# Patient Record
Sex: FEMALE | Race: WHITE | Employment: OTHER | URBAN - METROPOLITAN AREA
[De-identification: names, ages, dates, MRNs, and addresses within clinical notes are randomized per-mention and may not be internally consistent; named-entity substitution may affect disease eponyms.]

---

## 2022-03-19 PROBLEM — E78.2 MIXED HYPERLIPIDEMIA: Status: ACTIVE | Noted: 2020-05-26

## 2022-03-20 PROBLEM — R73.01 ELEVATED FASTING BLOOD SUGAR: Status: ACTIVE | Noted: 2020-05-26

## 2024-04-04 DIAGNOSIS — J44.9 CHRONIC OBSTRUCTIVE PULMONARY DISEASE, UNSPECIFIED COPD TYPE (HCC): Primary | ICD-10-CM

## 2024-04-09 ENCOUNTER — OFFICE VISIT (OUTPATIENT)
Dept: PULMONOLOGY | Age: 69
End: 2024-04-09
Payer: MEDICARE

## 2024-04-09 ENCOUNTER — HOSPITAL ENCOUNTER (OUTPATIENT)
Dept: GENERAL RADIOLOGY | Age: 69
Discharge: HOME | End: 2024-04-12
Payer: MEDICARE

## 2024-04-09 VITALS
TEMPERATURE: 97 F | HEART RATE: 69 BPM | RESPIRATION RATE: 14 BRPM | HEIGHT: 61 IN | BODY MASS INDEX: 24.55 KG/M2 | OXYGEN SATURATION: 97 % | WEIGHT: 130 LBS | SYSTOLIC BLOOD PRESSURE: 119 MMHG | DIASTOLIC BLOOD PRESSURE: 71 MMHG

## 2024-04-09 DIAGNOSIS — J44.9 CHRONIC OBSTRUCTIVE PULMONARY DISEASE, UNSPECIFIED COPD TYPE (HCC): ICD-10-CM

## 2024-04-09 DIAGNOSIS — Z12.2 SCREENING FOR LUNG CANCER: ICD-10-CM

## 2024-04-09 DIAGNOSIS — F17.210 SMOKING GREATER THAN 40 PACK YEARS: ICD-10-CM

## 2024-04-09 DIAGNOSIS — J44.9 CHRONIC OBSTRUCTIVE PULMONARY DISEASE, UNSPECIFIED COPD TYPE (HCC): Primary | ICD-10-CM

## 2024-04-09 DIAGNOSIS — D80.1 HYPOGAMMAGLOBULINEMIA (HCC): ICD-10-CM

## 2024-04-09 LAB
EXPIRATORY TIME: NORMAL
FEF 25-75% %PRED-PRE: NORMAL
FEF 25-75% PRED: NORMAL
FEF 25-75-PRE: NORMAL
FEV1 %PRED-PRE: 119 %
FEV1 PRED: 1.98 L
FEV1/FVC %PRED-PRE: NORMAL
FEV1/FVC PRED: NORMAL
FEV1/FVC: 83 %
FEV1: 2.36 L
FVC %PRED-PRE: 113 %
FVC PRED: 2.54 L
FVC: 2.86 L
PEF %PRED-PRE: NORMAL
PEF PRED: NORMAL
PEF-PRE: NORMAL

## 2024-04-09 PROCEDURE — G8400 PT W/DXA NO RESULTS DOC: HCPCS | Performed by: INTERNAL MEDICINE

## 2024-04-09 PROCEDURE — G8427 DOCREV CUR MEDS BY ELIG CLIN: HCPCS | Performed by: INTERNAL MEDICINE

## 2024-04-09 PROCEDURE — 1123F ACP DISCUSS/DSCN MKR DOCD: CPT | Performed by: INTERNAL MEDICINE

## 2024-04-09 PROCEDURE — 3017F COLORECTAL CA SCREEN DOC REV: CPT | Performed by: INTERNAL MEDICINE

## 2024-04-09 PROCEDURE — 99204 OFFICE O/P NEW MOD 45 MIN: CPT | Performed by: INTERNAL MEDICINE

## 2024-04-09 PROCEDURE — 71046 X-RAY EXAM CHEST 2 VIEWS: CPT

## 2024-04-09 PROCEDURE — G0296 VISIT TO DETERM LDCT ELIG: HCPCS | Performed by: INTERNAL MEDICINE

## 2024-04-09 PROCEDURE — G8420 CALC BMI NORM PARAMETERS: HCPCS | Performed by: INTERNAL MEDICINE

## 2024-04-09 PROCEDURE — 3023F SPIROM DOC REV: CPT | Performed by: INTERNAL MEDICINE

## 2024-04-09 PROCEDURE — 1090F PRES/ABSN URINE INCON ASSESS: CPT | Performed by: INTERNAL MEDICINE

## 2024-04-09 PROCEDURE — 94010 BREATHING CAPACITY TEST: CPT | Performed by: INTERNAL MEDICINE

## 2024-04-09 PROCEDURE — 4004F PT TOBACCO SCREEN RCVD TLK: CPT | Performed by: INTERNAL MEDICINE

## 2024-04-09 RX ORDER — MECLIZINE HYDROCHLORIDE 25 MG/1
25 TABLET ORAL EVERY 12 HOURS PRN
COMMUNITY

## 2024-04-09 RX ORDER — HYDROXYZINE HYDROCHLORIDE 25 MG/1
25 TABLET, FILM COATED ORAL
COMMUNITY

## 2024-04-09 RX ORDER — CELECOXIB 200 MG/1
CAPSULE ORAL
COMMUNITY
Start: 2024-01-25

## 2024-04-09 RX ORDER — MONTELUKAST SODIUM 10 MG/1
10 TABLET ORAL DAILY
COMMUNITY
Start: 2023-12-22

## 2024-04-09 ASSESSMENT — ENCOUNTER SYMPTOMS
EYE PAIN: 1
CONSTIPATION: 1
EYE ITCHING: 1
WHEEZING: 1
COUGH: 1
DIARRHEA: 1

## 2024-04-09 ASSESSMENT — PULMONARY FUNCTION TESTS
FEV1/FVC: 83
FVC_PREDICTED: 2.54
FEV1_PERCENT_PREDICTED_PRE: 119
FEV1: 2.36
FEV1_PREDICTED: 1.98
FVC_PERCENT_PREDICTED_PRE: 113
FVC: 2.86

## 2024-04-09 NOTE — PROGRESS NOTES
[address]  [place] [address]  [phone]    Patient Name:  Farideh Wall  YOB: 1955      Office Visit 4/9/2024    CHIEF COMPLAINT:    Chief Complaint   Patient presents with    New Patient    COPD       HISTORY OF PRESENT ILLNESS:      Patient is 68 years old white female who is here today in referral of Dr. Copeland for evaluation of shortness of breath and COPD.  Patient reports was told she has COPD maybe 2 to 3 years ago.  She does have episodes of repeated bronchitis and pneumonias maybe 2-3 times a year for the last 15 to 20 years.  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  She was told she has COPD maybe 3 years ago.  She was given multiple inhalers however she is refusing to use any.  She really denies shortness of breath.  She mostly complains about her bronchitis and cough she does cough all the time.  She says she also has significant postnasal drainage she does have sometimes yellow sometimes clear sometimes thick sputum.  She does have chronic sinus infection and she had sinus surgery about 2 years ago which she feels it did not help.  Her postnasal drainage is worse at night and in the morning.  She is on Singulair Flonase and Astellas and which she says helps.  She was on Zyrtec in the past however she is not using it.  She continues to smoke 1 pack a day and she has been smoking for 55 years and she says she likes it too much.    Past Medical History:   Diagnosis Date    Depression     Hypercholesterolemia     Psychotic disorder (HCC)          Patient Active Problem List   Diagnosis    Mixed hyperlipidemia    Elevated fasting blood sugar           Past Surgical History:   Procedure Laterality Date    APPENDECTOMY      BREAST SURGERY      COLONOSCOPY           Social History     Socioeconomic History    Marital status: